# Patient Record
Sex: FEMALE | Race: WHITE | NOT HISPANIC OR LATINO | Employment: FULL TIME | URBAN - METROPOLITAN AREA
[De-identification: names, ages, dates, MRNs, and addresses within clinical notes are randomized per-mention and may not be internally consistent; named-entity substitution may affect disease eponyms.]

---

## 2018-04-06 ENCOUNTER — OFFICE VISIT (OUTPATIENT)
Dept: INTERNAL MEDICINE | Facility: MEDICAL CENTER | Age: 33
End: 2018-04-06
Payer: COMMERCIAL

## 2018-04-06 VITALS
HEIGHT: 63 IN | HEART RATE: 82 BPM | WEIGHT: 208 LBS | OXYGEN SATURATION: 97 % | BODY MASS INDEX: 36.86 KG/M2 | DIASTOLIC BLOOD PRESSURE: 70 MMHG | TEMPERATURE: 97.4 F | SYSTOLIC BLOOD PRESSURE: 108 MMHG

## 2018-04-06 DIAGNOSIS — Z00.00 ROUTINE GENERAL MEDICAL EXAMINATION AT A HEALTH CARE FACILITY: ICD-10-CM

## 2018-04-06 DIAGNOSIS — F41.1 GENERALIZED ANXIETY DISORDER: ICD-10-CM

## 2018-04-06 DIAGNOSIS — F33.1 MODERATE EPISODE OF RECURRENT MAJOR DEPRESSIVE DISORDER (HCC): ICD-10-CM

## 2018-04-06 DIAGNOSIS — R22.1 LUMP IN NECK: ICD-10-CM

## 2018-04-06 DIAGNOSIS — G89.29 CHRONIC PAIN OF RIGHT KNEE: ICD-10-CM

## 2018-04-06 DIAGNOSIS — M25.561 CHRONIC PAIN OF RIGHT KNEE: ICD-10-CM

## 2018-04-06 PROBLEM — F41.9 ANXIETY: Status: ACTIVE | Noted: 2018-04-06

## 2018-04-06 PROCEDURE — 99204 OFFICE O/P NEW MOD 45 MIN: CPT | Mod: GC | Performed by: INTERNAL MEDICINE

## 2018-04-06 RX ORDER — ESCITALOPRAM OXALATE 20 MG/1
20 TABLET ORAL DAILY
Qty: 30 TAB | Refills: 2 | Status: SHIPPED | OUTPATIENT
Start: 2018-04-06 | End: 2018-05-11 | Stop reason: SDUPTHER

## 2018-04-06 ASSESSMENT — PATIENT HEALTH QUESTIONNAIRE - PHQ9
5. POOR APPETITE OR OVEREATING: 0 - NOT AT ALL
SUM OF ALL RESPONSES TO PHQ QUESTIONS 1-9: 17
CLINICAL INTERPRETATION OF PHQ2 SCORE: 6
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
## 2018-04-10 ENCOUNTER — HOSPITAL ENCOUNTER (OUTPATIENT)
Dept: LAB | Facility: MEDICAL CENTER | Age: 33
End: 2018-04-10
Attending: INTERNAL MEDICINE
Payer: COMMERCIAL

## 2018-04-10 DIAGNOSIS — Z00.00 ROUTINE GENERAL MEDICAL EXAMINATION AT A HEALTH CARE FACILITY: ICD-10-CM

## 2018-04-10 DIAGNOSIS — F33.1 MODERATE EPISODE OF RECURRENT MAJOR DEPRESSIVE DISORDER (HCC): ICD-10-CM

## 2018-04-10 LAB
ALBUMIN SERPL BCP-MCNC: 4.9 G/DL (ref 3.2–4.9)
ALBUMIN/GLOB SERPL: 1.8 G/DL
ALP SERPL-CCNC: 72 U/L (ref 30–99)
ALT SERPL-CCNC: 22 U/L (ref 2–50)
ANION GAP SERPL CALC-SCNC: 6 MMOL/L (ref 0–11.9)
AST SERPL-CCNC: 18 U/L (ref 12–45)
BASOPHILS # BLD AUTO: 0.7 % (ref 0–1.8)
BASOPHILS # BLD: 0.05 K/UL (ref 0–0.12)
BILIRUB SERPL-MCNC: 0.5 MG/DL (ref 0.1–1.5)
BUN SERPL-MCNC: 7 MG/DL (ref 8–22)
CALCIUM SERPL-MCNC: 9.5 MG/DL (ref 8.5–10.5)
CHLORIDE SERPL-SCNC: 103 MMOL/L (ref 96–112)
CHOLEST SERPL-MCNC: 225 MG/DL (ref 100–199)
CO2 SERPL-SCNC: 27 MMOL/L (ref 20–33)
CREAT SERPL-MCNC: 0.75 MG/DL (ref 0.5–1.4)
EOSINOPHIL # BLD AUTO: 0.2 K/UL (ref 0–0.51)
EOSINOPHIL NFR BLD: 2.7 % (ref 0–6.9)
ERYTHROCYTE [DISTWIDTH] IN BLOOD BY AUTOMATED COUNT: 42.5 FL (ref 35.9–50)
GLOBULIN SER CALC-MCNC: 2.7 G/DL (ref 1.9–3.5)
GLUCOSE SERPL-MCNC: 85 MG/DL (ref 65–99)
HCT VFR BLD AUTO: 42.9 % (ref 37–47)
HDLC SERPL-MCNC: 39 MG/DL
HGB BLD-MCNC: 14.1 G/DL (ref 12–16)
HIV 1+2 AB+HIV1 P24 AG SERPL QL IA: NON REACTIVE
IMM GRANULOCYTES # BLD AUTO: 0.01 K/UL (ref 0–0.11)
IMM GRANULOCYTES NFR BLD AUTO: 0.1 % (ref 0–0.9)
LDLC SERPL CALC-MCNC: 156 MG/DL
LYMPHOCYTES # BLD AUTO: 2.01 K/UL (ref 1–4.8)
LYMPHOCYTES NFR BLD: 27 % (ref 22–41)
MCH RBC QN AUTO: 28.6 PG (ref 27–33)
MCHC RBC AUTO-ENTMCNC: 32.9 G/DL (ref 33.6–35)
MCV RBC AUTO: 87 FL (ref 81.4–97.8)
MONOCYTES # BLD AUTO: 0.43 K/UL (ref 0–0.85)
MONOCYTES NFR BLD AUTO: 5.8 % (ref 0–13.4)
NEUTROPHILS # BLD AUTO: 4.75 K/UL (ref 2–7.15)
NEUTROPHILS NFR BLD: 63.7 % (ref 44–72)
NRBC # BLD AUTO: 0 K/UL
NRBC BLD-RTO: 0 /100 WBC
PLATELET # BLD AUTO: 251 K/UL (ref 164–446)
PMV BLD AUTO: 12 FL (ref 9–12.9)
POTASSIUM SERPL-SCNC: 3.7 MMOL/L (ref 3.6–5.5)
PROT SERPL-MCNC: 7.6 G/DL (ref 6–8.2)
RBC # BLD AUTO: 4.93 M/UL (ref 4.2–5.4)
SODIUM SERPL-SCNC: 136 MMOL/L (ref 135–145)
TRIGL SERPL-MCNC: 150 MG/DL (ref 0–149)
TSH SERPL DL<=0.005 MIU/L-ACNC: 1.2 UIU/ML (ref 0.38–5.33)
WBC # BLD AUTO: 7.5 K/UL (ref 4.8–10.8)

## 2018-04-10 PROCEDURE — 80053 COMPREHEN METABOLIC PANEL: CPT

## 2018-04-10 PROCEDURE — 80061 LIPID PANEL: CPT

## 2018-04-10 PROCEDURE — 36415 COLL VENOUS BLD VENIPUNCTURE: CPT

## 2018-04-10 PROCEDURE — 84443 ASSAY THYROID STIM HORMONE: CPT

## 2018-04-10 PROCEDURE — 87389 HIV-1 AG W/HIV-1&-2 AB AG IA: CPT

## 2018-04-10 PROCEDURE — 85025 COMPLETE CBC W/AUTO DIFF WBC: CPT

## 2018-04-23 ENCOUNTER — OFFICE VISIT (OUTPATIENT)
Dept: BEHAVIORAL HEALTH | Facility: PHYSICIAN GROUP | Age: 33
End: 2018-04-23
Payer: COMMERCIAL

## 2018-04-23 DIAGNOSIS — F43.23 ADJUSTMENT DISORDER WITH MIXED ANXIETY AND DEPRESSED MOOD: ICD-10-CM

## 2018-04-23 PROCEDURE — 90791 PSYCH DIAGNOSTIC EVALUATION: CPT | Performed by: SOCIAL WORKER
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
## 2018-05-08 ENCOUNTER — OFFICE VISIT (OUTPATIENT)
Dept: BEHAVIORAL HEALTH | Facility: PHYSICIAN GROUP | Age: 33
End: 2018-05-08
Payer: COMMERCIAL

## 2018-05-08 DIAGNOSIS — F43.23 ADJUSTMENT DISORDER WITH MIXED ANXIETY AND DEPRESSED MOOD: ICD-10-CM

## 2018-05-08 PROCEDURE — 90834 PSYTX W PT 45 MINUTES: CPT | Performed by: SOCIAL WORKER
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
## 2018-05-11 ENCOUNTER — OFFICE VISIT (OUTPATIENT)
Dept: INTERNAL MEDICINE | Facility: MEDICAL CENTER | Age: 33
End: 2018-05-11
Payer: COMMERCIAL

## 2018-05-11 VITALS
SYSTOLIC BLOOD PRESSURE: 102 MMHG | WEIGHT: 208 LBS | HEART RATE: 74 BPM | HEIGHT: 63 IN | BODY MASS INDEX: 36.86 KG/M2 | RESPIRATION RATE: 18 BRPM | OXYGEN SATURATION: 96 % | TEMPERATURE: 97.2 F | DIASTOLIC BLOOD PRESSURE: 70 MMHG

## 2018-05-11 DIAGNOSIS — R06.02 EXERTIONAL SHORTNESS OF BREATH: ICD-10-CM

## 2018-05-11 DIAGNOSIS — E78.5 DYSLIPIDEMIA: ICD-10-CM

## 2018-05-11 DIAGNOSIS — M25.561 CHRONIC PAIN OF RIGHT KNEE: ICD-10-CM

## 2018-05-11 DIAGNOSIS — F33.1 MODERATE EPISODE OF RECURRENT MAJOR DEPRESSIVE DISORDER (HCC): ICD-10-CM

## 2018-05-11 DIAGNOSIS — G89.29 CHRONIC PAIN OF RIGHT KNEE: ICD-10-CM

## 2018-05-11 DIAGNOSIS — F43.23 ADJUSTMENT DISORDER WITH MIXED ANXIETY AND DEPRESSED MOOD: ICD-10-CM

## 2018-05-11 DIAGNOSIS — D17.0 LIPOMA OF NECK: ICD-10-CM

## 2018-05-11 PROBLEM — F17.200 CURRENT SMOKER: Status: ACTIVE | Noted: 2018-05-11

## 2018-05-11 PROCEDURE — 99214 OFFICE O/P EST MOD 30 MIN: CPT | Mod: GC | Performed by: INTERNAL MEDICINE

## 2018-05-11 RX ORDER — ESCITALOPRAM OXALATE 20 MG/1
20 TABLET ORAL DAILY
Qty: 90 TAB | Refills: 3 | Status: SHIPPED | OUTPATIENT
Start: 2018-05-11 | End: 2019-03-01

## 2018-05-11 RX ORDER — FLUTICASONE PROPIONATE 50 MCG
1 SPRAY, SUSPENSION (ML) NASAL DAILY
Qty: 16 G | Refills: 3 | Status: SHIPPED | OUTPATIENT
Start: 2018-05-11

## 2018-05-11 RX ORDER — ALBUTEROL SULFATE 90 UG/1
2 AEROSOL, METERED RESPIRATORY (INHALATION) EVERY 6 HOURS PRN
Qty: 8.5 G | Refills: 6 | Status: SHIPPED | OUTPATIENT
Start: 2018-05-11

## 2018-05-11 ASSESSMENT — PATIENT HEALTH QUESTIONNAIRE - PHQ9
CLINICAL INTERPRETATION OF PHQ2 SCORE: 2
SUM OF ALL RESPONSES TO PHQ QUESTIONS 1-9: 13
5. POOR APPETITE OR OVEREATING: 2 - MORE THAN HALF THE DAYS

## 2018-05-11 ASSESSMENT — PAIN SCALES - GENERAL: PAINLEVEL: NO PAIN

## 2018-05-11 ASSESSMENT — ACTIVITIES OF DAILY LIVING (ADL): BATHING_REQUIRES_ASSISTANCE: 0
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
## 2018-07-16 ENCOUNTER — APPOINTMENT (OUTPATIENT)
Dept: BEHAVIORAL HEALTH | Facility: PHYSICIAN GROUP | Age: 33
End: 2018-07-16
Payer: COMMERCIAL

## 2018-10-25 ENCOUNTER — HOSPITAL ENCOUNTER (OUTPATIENT)
Dept: RADIOLOGY | Facility: MEDICAL CENTER | Age: 33
End: 2018-10-25
Attending: PHYSICIAN ASSISTANT
Payer: COMMERCIAL

## 2018-10-25 ENCOUNTER — OFFICE VISIT (OUTPATIENT)
Dept: URGENT CARE | Facility: CLINIC | Age: 33
End: 2018-10-25
Payer: COMMERCIAL

## 2018-10-25 VITALS
OXYGEN SATURATION: 97 % | HEART RATE: 78 BPM | HEIGHT: 63 IN | SYSTOLIC BLOOD PRESSURE: 118 MMHG | BODY MASS INDEX: 38.8 KG/M2 | WEIGHT: 219 LBS | TEMPERATURE: 97.1 F | DIASTOLIC BLOOD PRESSURE: 70 MMHG

## 2018-10-25 DIAGNOSIS — R22.1 NECK MASS: ICD-10-CM

## 2018-10-25 PROCEDURE — 99214 OFFICE O/P EST MOD 30 MIN: CPT | Performed by: PHYSICIAN ASSISTANT

## 2018-10-25 PROCEDURE — 76536 US EXAM OF HEAD AND NECK: CPT

## 2018-10-27 ASSESSMENT — ENCOUNTER SYMPTOMS
SORE THROAT: 0
DIARRHEA: 0
VOMITING: 0
PHOTOPHOBIA: 0
ABDOMINAL PAIN: 0
NECK PAIN: 1
SHORTNESS OF BREATH: 0
WEAKNESS: 0
NAUSEA: 0
FEVER: 0
CHILLS: 0
DIZZINESS: 0

## 2018-10-27 NOTE — PROGRESS NOTES
"Subjective:      Breanna Bryan is a 33 y.o. female who presents with Neck Pain (suspected lipoma on left of neck getting bigger and painful in last couple weeks, grown 4x the size)            Neck Pain    This is a new problem. The current episode started 1 to 4 weeks ago (2 weeks). The problem occurs constantly. The problem has been gradually worsening. The pain is associated with nothing. The pain is present in the left side. The quality of the pain is described as aching. The pain is at a severity of 4/10. The pain is moderate. Nothing aggravates the symptoms. Pertinent negatives include no chest pain, fever, photophobia or weakness.     Patient presents to urgent care reporting a 2 week history of pain and swelling in her neck. She states she had a painless mass in the area for abut 3 years but it never changed size. She has been told the mass is a lipoma. It recently began swelling and becoming more painful. She denies fevers, chills, body aches, chest pain, SOB, cough, sore throat, or difficulty swallowing.     Review of Systems   Constitutional: Negative for chills and fever.   HENT: Negative for congestion and sore throat.         + neck mass   Eyes: Negative for photophobia.   Respiratory: Negative for shortness of breath.    Cardiovascular: Negative for chest pain.   Gastrointestinal: Negative for abdominal pain, diarrhea, nausea and vomiting.   Genitourinary: Negative.    Musculoskeletal: Positive for neck pain.   Skin: Negative for rash.   Neurological: Negative for dizziness and weakness.        Objective:     /70 (BP Location: Left arm, Patient Position: Sitting, BP Cuff Size: Large adult)   Pulse 78   Temp 36.2 °C (97.1 °F) (Temporal)   Ht 1.6 m (5' 3\")   Wt 99.3 kg (219 lb)   SpO2 97%   BMI 38.79 kg/m²      Physical Exam   Constitutional: She is oriented to person, place, and time. She appears well-developed and well-nourished. No distress.   HENT:   Head: Normocephalic and " atraumatic.       Firm mass present over left anterior cervical region, TTP. No overlying erythema, edema, or warmth to touch.    Eyes: Pupils are equal, round, and reactive to light.   Neck: Normal range of motion. No thyromegaly present.   Cardiovascular: Normal rate.    Pulmonary/Chest: Effort normal.   Musculoskeletal: Normal range of motion.   Lymphadenopathy:     She has no cervical adenopathy.   Neurological: She is alert and oriented to person, place, and time.   Skin: Skin is warm and dry. She is not diaphoretic.   Psychiatric: She has a normal mood and affect. Her behavior is normal.   Nursing note and vitals reviewed.         PMH:  has a past medical history of Anxiety and Depression.  MEDS:   Current Outpatient Prescriptions:   •  escitalopram (LEXAPRO) 20 MG tablet, Take 1 Tab by mouth every day., Disp: 90 Tab, Rfl: 3  •  albuterol 108 (90 Base) MCG/ACT Aero Soln inhalation aerosol, Inhale 2 Puffs by mouth every 6 hours as needed for Shortness of Breath (before exercise / walking). (Patient not taking: Reported on 10/25/2018), Disp: 8.5 g, Rfl: 6  •  fluticasone (FLONASE) 50 MCG/ACT nasal spray, Spray 1 Spray in nose every day. (Patient not taking: Reported on 10/25/2018), Disp: 16 g, Rfl: 3  •  Doxylamine Succinate, Sleep, (UNISOM PO), Take  by mouth as needed., Disp: , Rfl:   •  Non Formulary Request, Marijuana.prn, Disp: , Rfl:   ALLERGIES: No Known Allergies  SURGHX:   Past Surgical History:   Procedure Laterality Date   • OTHER Left 2002    reactive lymph node     SOCHX:  reports that she has been smoking Cigarettes.  She has a 6.00 pack-year smoking history. She has never used smokeless tobacco. She reports that she drinks about 1.2 oz of alcohol per week . She reports that she does not use drugs.  FH: family history includes Alcohol abuse in her father; Dementia (age of onset: 60) in her maternal grandfather; Depression in her mother; Drug abuse in her mother; Heart Disease (age of onset: 40) in  her father; Hyperlipidemia in her father; Hypertension in her father; Psychiatry in her mother; Suicide Attempts in her maternal uncle and mother.       Assessment/Plan:     1. Neck mass  - US-SOFT TISSUES OF HEAD - NECK; Future  Impression       Complicated probable cystic (based on appearance and reported discharge with manipulation) structure corresponding with the palpable lump. The reported chronicity would suggest a benign process possibly a sebaceous cyst or branchial cleft cyst. Consider   ENT referral for treatment or further evaluation with CT scan to better evaluate location and surrounding anatomy       - REFERRAL TO ENT  - CT-SOFT TISSUE NECK WITH; Future    Discussed US results with patient. She will schedule CT-soft tissue neck at her convenience for further evaluation and management. Referral placed to ENT today. Pending CT results.

## 2018-10-28 ENCOUNTER — HOSPITAL ENCOUNTER (OUTPATIENT)
Dept: RADIOLOGY | Facility: MEDICAL CENTER | Age: 33
End: 2018-10-28
Attending: PHYSICIAN ASSISTANT
Payer: COMMERCIAL

## 2018-10-28 DIAGNOSIS — R22.1 NECK MASS: ICD-10-CM

## 2018-10-28 PROCEDURE — 700117 HCHG RX CONTRAST REV CODE 255: Performed by: PHYSICIAN ASSISTANT

## 2018-10-28 PROCEDURE — 70491 CT SOFT TISSUE NECK W/DYE: CPT

## 2018-10-28 RX ADMIN — IOHEXOL 80 ML: 350 INJECTION, SOLUTION INTRAVENOUS at 08:23

## 2018-10-29 ENCOUNTER — TELEPHONE (OUTPATIENT)
Dept: URGENT CARE | Facility: PHYSICIAN GROUP | Age: 33
End: 2018-10-29

## 2018-10-29 DIAGNOSIS — L72.3 INFECTED SEBACEOUS CYST: ICD-10-CM

## 2018-10-29 DIAGNOSIS — L08.9 INFECTED SEBACEOUS CYST: ICD-10-CM

## 2018-10-29 RX ORDER — SULFAMETHOXAZOLE AND TRIMETHOPRIM 800; 160 MG/1; MG/1
1 TABLET ORAL 2 TIMES DAILY
Qty: 14 TAB | Refills: 0 | Status: SHIPPED | OUTPATIENT
Start: 2018-10-29 | End: 2018-11-05

## 2018-10-29 NOTE — TELEPHONE ENCOUNTER
Spoke to patient and informed her of CT results, likely infected sebaceous cyst. Will send course of Bactrim to patient's pharmacy, encouraged warm compresses to the area. She will follow up with ENT after infection has resolved for discussion of surgical removal. She states understanding and appreciates the phone call.

## 2018-11-02 ENCOUNTER — OFFICE VISIT (OUTPATIENT)
Dept: INTERNAL MEDICINE | Facility: MEDICAL CENTER | Age: 33
End: 2018-11-02
Payer: COMMERCIAL

## 2018-11-02 VITALS
DIASTOLIC BLOOD PRESSURE: 70 MMHG | BODY MASS INDEX: 39.12 KG/M2 | HEIGHT: 63 IN | SYSTOLIC BLOOD PRESSURE: 112 MMHG | HEART RATE: 79 BPM | OXYGEN SATURATION: 96 % | TEMPERATURE: 97.5 F | WEIGHT: 220.8 LBS

## 2018-11-02 DIAGNOSIS — Z23 NEED FOR PNEUMOCOCCAL VACCINE: ICD-10-CM

## 2018-11-02 DIAGNOSIS — E78.5 DYSLIPIDEMIA: ICD-10-CM

## 2018-11-02 DIAGNOSIS — L72.3 INFECTED SEBACEOUS CYST: ICD-10-CM

## 2018-11-02 DIAGNOSIS — F43.23 ADJUSTMENT DISORDER WITH MIXED ANXIETY AND DEPRESSED MOOD: ICD-10-CM

## 2018-11-02 DIAGNOSIS — L08.9 INFECTED SEBACEOUS CYST: ICD-10-CM

## 2018-11-02 DIAGNOSIS — J45.990 EXERCISE-INDUCED ASTHMA: ICD-10-CM

## 2018-11-02 DIAGNOSIS — F17.200 CURRENT SMOKER: ICD-10-CM

## 2018-11-02 DIAGNOSIS — E66.9 OBESITY (BMI 35.0-39.9 WITHOUT COMORBIDITY): ICD-10-CM

## 2018-11-02 DIAGNOSIS — Z23 NEEDS FLU SHOT: ICD-10-CM

## 2018-11-02 PROCEDURE — 99214 OFFICE O/P EST MOD 30 MIN: CPT | Mod: GC,25 | Performed by: INTERNAL MEDICINE

## 2018-11-02 PROCEDURE — 90472 IMMUNIZATION ADMIN EACH ADD: CPT | Performed by: INTERNAL MEDICINE

## 2018-11-02 PROCEDURE — 90686 IIV4 VACC NO PRSV 0.5 ML IM: CPT | Performed by: INTERNAL MEDICINE

## 2018-11-02 PROCEDURE — 90732 PPSV23 VACC 2 YRS+ SUBQ/IM: CPT | Performed by: INTERNAL MEDICINE

## 2018-11-02 PROCEDURE — 90471 IMMUNIZATION ADMIN: CPT | Performed by: INTERNAL MEDICINE

## 2018-11-02 NOTE — PROGRESS NOTES
Established Patient    Breanna presents today with the following:    CC:   Chief Complaint   Patient presents with   • Follow-Up   • Cyst       HPI:   32 yo F came in for follow up for her chronic medical conditions and recent urgent care visit with infective sebaceous cyst..    She was here 6 months ago with a cyst in her left neck, assumed to be lipoma.  It was 1 cm at that time and it did not change on subsequent visit.  About a week ago, it became red inflamed and got bigger.  She noticed some yellowish greenish discharge.  She does not have any fever or chills.  She went to urgent care, got ultrasound and CT of neck which showed 1.3 cm enhancing fluid collection likely infectious sebaceous cyst. ENT referral has been placed by urgent care. She was given 7-day course of Bactrim.  Today is her sixth day of antibiotic and it is not improving.     Her anxiety and depression is not well controlled with Lexapro 20 mg daily. She saw a counselor a few times but was not very effective because she can only see once a month and appointments are hard to make.     She still noticed wheezing and shortness of breath with exercise.  Albuterol inhaler helps, using 2 or 3 times a month since she is not exercising frequently now.  She still smokes about 5 cigarettes a day and not able to quit.     She gained 12 pounds since last visit 6 months ago. she forgot repeat labs for dyslipidemia.  She only eats 2 meals a day. Lunch vegan pasta or fast food and dinner mostly fast food or snacks like cheese. She drinks soda (mountain dew or regular coke).    ROS:   A 14  systems review done, negative except as stated above.    Patient Active Problem List    Diagnosis Date Noted   • Dyslipidemia 05/11/2018   • Exercise-induced asthma 05/11/2018   • Current smoker 05/11/2018   • Adjustment disorder with mixed anxiety and depressed mood 04/23/2018   • Sebaceous cyst 04/06/2018   • Right knee pain 04/06/2018   • Moderate episode of  "recurrent major depressive disorder (HCC) 04/06/2018   • Generalized anxiety disorder 04/06/2018       Current Outpatient Prescriptions   Medication Sig Dispense Refill   • dicloxacillin (DYNAPEN) 500 MG Cap Take 1 Cap by mouth 4 times a day. 28 Cap 0   • sulfamethoxazole-trimethoprim (BACTRIM DS) 800-160 MG tablet Take 1 Tab by mouth 2 times a day for 7 days. 14 Tab 0   • albuterol 108 (90 Base) MCG/ACT Aero Soln inhalation aerosol Inhale 2 Puffs by mouth every 6 hours as needed for Shortness of Breath (before exercise / walking). 8.5 g 6   • fluticasone (FLONASE) 50 MCG/ACT nasal spray Spray 1 Spray in nose every day. 16 g 3   • escitalopram (LEXAPRO) 20 MG tablet Take 1 Tab by mouth every day. 90 Tab 3   • Doxylamine Succinate, Sleep, (UNISOM PO) Take  by mouth as needed.     • Non Formulary Request Marijuana.prn       No current facility-administered medications for this visit.          /70 (BP Location: Right arm, Patient Position: Sitting)   Pulse 79   Temp 36.4 °C (97.5 °F) (Temporal)   Ht 1.6 m (5' 3\")   Wt 100.2 kg (220 lb 12.8 oz)   SpO2 96%   BMI 39.11 kg/m²     Physical Exam  General: Alert and oriented, No apparent distress.  Eyes: Pupils are equal and reactive. No scleral icterus.  Throat: Clear no erythema or exudates noted.  Neck: Supple. No lymphadenopathy noted. 2x2cm nodule on right neck right under the skin with erythema and tenderness. No visible open skin or discharge.   Lungs: Clear to auscultation bilaterally without any wheezing, crepitations.  Cardiovascular: Regular rate and rhythm. No murmurs, rubs or gallops.  Abdomen: Bowel sound +, soft, non tender, no rebound or guarding, no palpable organomegaly  Extremities: No clubbing, cyanosis, edema.  Skin: No rash or suspicious skin lesions noted.  Neuro: A & O x 3. Normal speech and memory. Motor and sensory grossly normal.     Note: I have reviewed all pertinent labs and diagnostic tests associated with this visit with specific " comments listed under the assessment and plan below    Assessment and Plan    1. Infected sebaceous cyst   2x2cm nodule on right neck right under the skin with erythema and tenderness. No visible open skin or discharge. It is definitely bigger than her last visit and definitely infected.  -We will extend her antibiotic course with dicloxacillin for another week.   -Instructed her to make an appointment with the ENT since she is not responding to antibiotic and likely need incision and drainage.  She is concerned that her insurance might not cover since she was told by urgent care that this is aesthetic related surgery, but currently it is infected and it is medically necessary.   - dicloxacillin (DYNAPEN) 500 MG Cap; Take 1 Cap by mouth 4 times a day.  Dispense: 28 Cap; Refill: 0    2. Adjustment disorder with mixed anxiety and depressed mood  - REFERRAL TO PSYCHIATRY for further management, likely need addition of second agent to lexapro.    3. Exercise-induced asthma  - continue prn albuterol     4. Current smoker  - counseled about cessation. Explained permanent damage to her lung and other health consequences, especially in the setting of exercise induced asthma. She is not ready to quit, but she will think about it.    5. Dyslipidemia  6. Obesity (BMI 35.0-39.9 without comorbidity)  - encouraged dietary changes. Her diet consists mostly of carbs and fast food. She will try replacing cheese with nuts. Instructed to drink a glass of water before meals. Keep a diet journal for 7 days and bring it to next appointment.    7. Need for pneumococcal vaccine  - since she is active smoker and has exercise induced asthma, updated PneumoVax PPV23 =>3yo    8. Needs flu shot  - Flu Quad Inj >3 Year Pre-Filled PF        Return in about 5 weeks (around 12/7/2018) for Long.    Signed by: Gaby Gurrola M.D.

## 2018-11-02 NOTE — NON-PROVIDER
"Breanna Bryan is a 33 y.o. female here for a non-provider visit for:   FLU    Reason for immunization: Annual Flu Vaccine  Immunization records indicate need for vaccine: Yes, confirmed with Epic  Minimum interval has been met for this vaccine: Yes  ABN completed: Not Indicated    Order and dose verified by: Alee HIGGINBOTHAM Dated  8/7/15 was given to patient: Yes  All IAC Questionnaire questions were answered \"No.\"    Patient tolerated injection and no adverse effects were observed or reported: Yes    Pt scheduled for next dose in series: Not Indicated    Breanna Bryan is a 33 y.o. female here for a non-provider visit for:   PNEUMOVAX (PPSV23)    Reason for immunization: Annual Flu Vaccine  Immunization records indicate need for vaccine: Yes, confirmed with Epic  Minimum interval has been met for this vaccine: Yes  ABN completed: Not Indicated    Order and dose verified by: Alee HIGGINBOTHAM Dated  4/24/15 was given to patient: Yes  All IAC Questionnaire questions were answered \"No.\"    Patient tolerated injection and no adverse effects were observed or reported: Yes    Pt scheduled for next dose in series: Not Indicated      "

## 2018-12-06 ENCOUNTER — HOSPITAL ENCOUNTER (OUTPATIENT)
Dept: LAB | Facility: MEDICAL CENTER | Age: 33
End: 2018-12-06
Attending: ANESTHESIOLOGY
Payer: COMMERCIAL

## 2018-12-06 LAB
HCG SERPL QL: NEGATIVE
HCT VFR BLD AUTO: 40.7 % (ref 37–47)

## 2018-12-06 PROCEDURE — 85014 HEMATOCRIT: CPT

## 2018-12-06 PROCEDURE — 36415 COLL VENOUS BLD VENIPUNCTURE: CPT

## 2018-12-06 PROCEDURE — 84703 CHORIONIC GONADOTROPIN ASSAY: CPT

## 2019-06-14 DIAGNOSIS — R06.02 EXERTIONAL SHORTNESS OF BREATH: ICD-10-CM

## 2019-06-14 RX ORDER — FLUTICASONE PROPIONATE 50 MCG
1 SPRAY, SUSPENSION (ML) NASAL DAILY
Refills: 3 | OUTPATIENT
Start: 2019-06-14

## 2019-06-18 DIAGNOSIS — F33.1 MODERATE EPISODE OF RECURRENT MAJOR DEPRESSIVE DISORDER (HCC): ICD-10-CM

## 2019-06-18 RX ORDER — ESCITALOPRAM OXALATE 20 MG/1
TABLET ORAL
Refills: 3 | OUTPATIENT
Start: 2019-06-18

## 2023-03-01 ENCOUNTER — OFFICE VISIT (OUTPATIENT)
Dept: URGENT CARE | Facility: PHYSICIAN GROUP | Age: 38
End: 2023-03-01

## 2023-03-01 VITALS
OXYGEN SATURATION: 96 % | TEMPERATURE: 98.5 F | HEIGHT: 62 IN | SYSTOLIC BLOOD PRESSURE: 118 MMHG | RESPIRATION RATE: 16 BRPM | WEIGHT: 221.4 LBS | BODY MASS INDEX: 40.74 KG/M2 | HEART RATE: 100 BPM | DIASTOLIC BLOOD PRESSURE: 64 MMHG

## 2023-03-01 DIAGNOSIS — L72.3 SEBACEOUS CYST: ICD-10-CM

## 2023-03-01 DIAGNOSIS — J35.1 ENLARGED TONSILS: ICD-10-CM

## 2023-03-01 DIAGNOSIS — Z76.89 REFERRAL OF PATIENT: ICD-10-CM

## 2023-03-01 DIAGNOSIS — R22.1 LUMP ON NECK: ICD-10-CM

## 2023-03-01 PROCEDURE — 99213 OFFICE O/P EST LOW 20 MIN: CPT | Performed by: STUDENT IN AN ORGANIZED HEALTH CARE EDUCATION/TRAINING PROGRAM

## 2023-03-01 RX ORDER — AMOXICILLIN 500 MG/1
500 CAPSULE ORAL 2 TIMES DAILY
COMMUNITY
Start: 2023-01-27 | End: 2023-03-01

## 2023-03-01 ASSESSMENT — ENCOUNTER SYMPTOMS
SORE THROAT: 0
FEVER: 0
SHORTNESS OF BREATH: 0
PALPITATIONS: 0
CHILLS: 0
WHEEZING: 0

## 2023-03-01 NOTE — PROGRESS NOTES
"Subjective     Breanna Bryan is a 37 y.o. female who presents with Neck Problem (Lump located on neck, gradually growing, no pain/tenderness, x6 month )            Breanna is a 37 y.o. female who presents to urgent care for a lump/cyst on her neck.  Patient states lump is at that exact location previous sebaceous cyst.  Patient states she had sebaceous cyst removed by ENT.  She states approximately 6 months ago she noticed slight lump under her scar from prior removal.  She states it has increased in size over the last 6 months.  She has not noticed any redness or warmth to the area.  States it is not painful or tender.  Patient requesting referral back to ENT.  Patient also would like to have referral to ENT to discuss her enlarged tonsils.  Patient denies any sore throat.  She does state that she has a history of tonsil stones which cause pain/discomfort when she has stones.  Denies history of recurrent strep infections.  No difficulty swallowing.      Review of Systems   Constitutional:  Negative for chills, fever and malaise/fatigue.   HENT:  Negative for sore throat.    Respiratory:  Negative for shortness of breath and wheezing.    Cardiovascular:  Negative for chest pain and palpitations.   All other systems reviewed and are negative.           Objective     /64 (BP Location: Left arm, Patient Position: Sitting, BP Cuff Size: Adult)   Pulse 100   Temp 36.9 °C (98.5 °F) (Temporal)   Resp 16   Ht 1.575 m (5' 2\")   Wt 100 kg (221 lb 6.4 oz)   SpO2 96%   BMI 40.49 kg/m²      Physical Exam  Vitals reviewed.   Constitutional:       General: She is not in acute distress.     Appearance: Normal appearance. She is not ill-appearing or toxic-appearing.   HENT:      Head: Normocephalic and atraumatic.      Nose: Nose normal.      Mouth/Throat:      Lips: Pink.      Mouth: Mucous membranes are moist.      Pharynx: Oropharynx is clear. Uvula midline. No oropharyngeal exudate or posterior " oropharyngeal erythema.      Tonsils: No tonsillar exudate or tonsillar abscesses. 2+ on the right. 2+ on the left.   Eyes:      Extraocular Movements: Extraocular movements intact.      Conjunctiva/sclera: Conjunctivae normal.      Pupils: Pupils are equal, round, and reactive to light.   Neck:        Comments: Approx. 1x1cm palpable cyst. No fluctuance. Overlying skin with scar tissue from prior removal of sebaceous cyst. No erythema or warmth. Non-tender to palpation.  Cardiovascular:      Rate and Rhythm: Normal rate.   Pulmonary:      Effort: Pulmonary effort is normal.   Musculoskeletal:      Cervical back: Normal range of motion. No rigidity or tenderness.   Neurological:      General: No focal deficit present.      Mental Status: She is alert. Mental status is at baseline.                           Assessment & Plan        1. Lump on neck  - US-SOFT TISSUES OF HEAD - NECK; Future  - Approx. 1x1cm palpable cyst. No fluctuance. Overlying skin with scar tissue from prior removal of sebaceous cyst. No erythema or warmth. Non-tender to palpation.  - Prior sebaceous cyst at same location of current lump/cyst.   - Referral to ENT    2. Sebaceous cyst  - Patient reports prior cyst removal from ENT. Requesting referral back to ENT. Referral placed.  - Referral to ENT    3. Enlarged tonsils  - Patient states she would also like to discuss with ENT tonsillectomy due to enlarged tonsils and history of tonsil stones. Tonsils 2+ bilaterally without erythema or exudate.   - Referral to ENT    4. Referral of patient  - Referral to ENT     Differential diagnoses, supportive care, and indications for immediate follow-up discussed with patient. Pathogenesis of diagnosis discussed including typical length and natural progression.      Instructed to return to urgent care or nearest emergency department for any change in condition, further concerns, or new concerning symptoms.    Patient states understanding and agree with the  plan of care and discharge instructions.

## 2023-03-19 ENCOUNTER — HOSPITAL ENCOUNTER (OUTPATIENT)
Dept: RADIOLOGY | Facility: MEDICAL CENTER | Age: 38
End: 2023-03-19
Attending: STUDENT IN AN ORGANIZED HEALTH CARE EDUCATION/TRAINING PROGRAM

## 2023-03-19 DIAGNOSIS — R22.1 LUMP ON NECK: ICD-10-CM

## 2023-03-19 PROCEDURE — 76536 US EXAM OF HEAD AND NECK: CPT
